# Patient Record
Sex: FEMALE | Race: WHITE | NOT HISPANIC OR LATINO | Employment: STUDENT | URBAN - NONMETROPOLITAN AREA
[De-identification: names, ages, dates, MRNs, and addresses within clinical notes are randomized per-mention and may not be internally consistent; named-entity substitution may affect disease eponyms.]

---

## 2021-09-05 ENCOUNTER — OFFICE VISIT (OUTPATIENT)
Dept: FAMILY MEDICINE | Facility: OTHER | Age: 21
End: 2021-09-05
Attending: INTERNAL MEDICINE
Payer: COMMERCIAL

## 2021-09-05 VITALS
HEART RATE: 55 BPM | RESPIRATION RATE: 18 BRPM | SYSTOLIC BLOOD PRESSURE: 118 MMHG | HEIGHT: 66 IN | DIASTOLIC BLOOD PRESSURE: 60 MMHG | BODY MASS INDEX: 23.78 KG/M2 | OXYGEN SATURATION: 98 % | WEIGHT: 148 LBS | TEMPERATURE: 97.5 F

## 2021-09-05 DIAGNOSIS — R22.1 THROAT SWELLING: Primary | ICD-10-CM

## 2021-09-05 DIAGNOSIS — R22.0 RIGHT FACIAL SWELLING: ICD-10-CM

## 2021-09-05 DIAGNOSIS — R13.10 PAIN WITH SWALLOWING: ICD-10-CM

## 2021-09-05 LAB — GROUP A STREP BY PCR: NOT DETECTED

## 2021-09-05 PROCEDURE — 87651 STREP A DNA AMP PROBE: CPT | Mod: ZL | Performed by: NURSE PRACTITIONER

## 2021-09-05 PROCEDURE — 99203 OFFICE O/P NEW LOW 30 MIN: CPT | Performed by: NURSE PRACTITIONER

## 2021-09-05 RX ORDER — SERTRALINE HYDROCHLORIDE 100 MG/1
100 TABLET, FILM COATED ORAL
COMMUNITY
Start: 2021-05-06

## 2021-09-05 ASSESSMENT — MIFFLIN-ST. JEOR: SCORE: 1453.07

## 2021-09-05 ASSESSMENT — PAIN SCALES - GENERAL: PAINLEVEL: SEVERE PAIN (6)

## 2021-09-05 NOTE — PATIENT INSTRUCTIONS
Neck swelling. Continue to ice the site. Use 800 mg ibuprofen every 6 hours (this puts you at max daily limit of 3200 mg per day).   Follow up emergently if symptoms worsen or if you develop inability to swallow or breathe during your travels home to [place].     At this time, causes are not related to ear infection, TMJ, sinus infection. No signs of infection are present (fevers,chills, or other symptoms).     Unknown cause of this but discussed could be a swollen lymph node related to allergies or a stone in a gland.     Claritan or zyrtec may be helpful.     If strep swab is positive, will treat with antibiotics.

## 2021-09-05 NOTE — PROGRESS NOTES
ASSESSMENT/PLAN:    I have reviewed the nursing notes.  I have reviewed the findings, diagnosis, plan and need for follow up with the patient.    1. Throat swelling  2. Pain with swallowing  3. Right facial swelling/neck swelling  - Group A Streptococcus PCR Throat Swab  Negative strep - no antibiotics are indicated.   Differential includes cervical lymphadenopathy of right side lymph node, salivary gland stone, strep pharyngitis. No evidence of peritonsillar abscess or dental abscess. Physical examination otherwise benign today. Afebrile. No indication to start empiric antibiotics as discussed with Lissa. I recommend 800 mg ibuprofen every six hours for swelling and discomfort along with ice compresses 4x per day. A CT scan was discussed and offered to diagnose cause of swelling which Lissa declined. I recommend if worsening symptoms she should be seen tonight when she returns home to [place]. She is leaving straight from here to drive home.     Discussed warning signs/symptoms indicative of need to f/u    Follow up if symptoms persist or worsen or concerns    I explained my diagnostic considerations and recommendations to the patient, who voiced understanding and agreement with the treatment plan. All questions were answered. We discussed potential side effects of any prescribed or recommended therapies, as well as expectations for response to treatments.    Deepa Mcdowell NP  9/5/2021  2:22 PM    HPI:  Lissa Ria Ray is a 21 year old female who presents to [facility] today for concerns of facial swelling to the right jaw line that started yesterday morning, worsening throughout the day. Woke up this morning much more significant and slight discomfort to the area. Does rate pain in the area a 6/10. No known broken teeth, states does get regular dental care. No fever/chills. Does not feel unwell in any way.  Pain feels more localized to the outside/skin rather than inside her mouth. She lives in  "[place] and will be traveling back home today. She is not agreeable to having a CT scan today (discussed may be indicated) or any further testing until she goes home due to time constraints.   Took 600 mg ibuprofen at 0930 this AM with some relief of the discomfort. Has been icing the site.   Denies ear pain, sore throat. States does hurt a bit to swallow on that side.     Otherwise negative ROS.     No past medical history on file.  No past surgical history on file.  Social History     Tobacco Use     Smoking status: Never Smoker     Smokeless tobacco: Never Used   Substance Use Topics     Alcohol use: Not Currently     Current Outpatient Medications   Medication Sig Dispense Refill     sertraline (ZOLOFT) 100 MG tablet Take 100 mg by mouth       No Known Allergies  Past medical history, past surgical history, current medications and allergies reviewed and accurate to the best of my knowledge.      ROS:  Refer to HPI    /60 (BP Location: Left arm, Patient Position: Sitting, Cuff Size: Adult Regular)   Pulse 55   Temp 97.5  F (36.4  C) (Tympanic)   Resp 18   Ht 1.676 m (5' 6\")   Wt 67.1 kg (148 lb)   LMP 08/30/2021   SpO2 98%   Breastfeeding No   BMI 23.89 kg/m      EXAM:  General Appearance: Well appearing 21 year old female, appropriate appearance for age. No acute distress  Ears: Left TM intact, translucent with bony landmarks appreciated, no erythema, no effusion, no bulging, no purulence.  Right TM intact, translucent with bony landmarks appreciated, no erythema, no effusion, no bulging, no purulence.  Left auditory canal clear.  Right auditory canal clear.  Normal external ears, non tender.  Eyes: conjunctivae normal without erythema or irritation, corneas clear, no drainage or crusting, no eyelid swelling, pupils equal   Orophayrnx: moist mucous membranes, posterior pharynx without erythema, tonsils without hypertrophy, no erythema, no exudates or petechiae, no post nasal drip seen, no " trismus, voice clear.    Sinuses:  No sinus tenderness upon palpation of the frontal or maxillary sinuses  Nose:  Bilateral nares: no erythema, no edema, no drainage or congestion   Neck: right side of neck below jaw line: there is a palpable cervical lymph node versus enlarged gland versus abscess with moderate amount of swelling to surrounding location. No erythema or warmth. No drainage observed.   Respiratory: normal chest wall and respirations.  Normal effort.  Clear to auscultation bilaterally, no wheezing, crackles or rhonchi.  No increased work of breathing.  No cough appreciated.  Cardiac: RRR with no murmurs  Psychological: normal affect, alert, oriented, and pleasant.

## 2021-09-05 NOTE — NURSING NOTE
"Chief Complaint   Patient presents with     Facial Swelling     Patient presented to the clinic with right sided facial swelling that started yesterday and this morning it is worse. She is unsure of what could be causing it.    Initial /60 (BP Location: Left arm, Patient Position: Sitting, Cuff Size: Adult Regular)   Pulse 55   Temp 97.5  F (36.4  C) (Tympanic)   Resp 18   Ht 1.676 m (5' 6\")   Wt 67.1 kg (148 lb)   LMP 08/30/2021   SpO2 98%   Breastfeeding No   BMI 23.89 kg/m   Estimated body mass index is 23.89 kg/m  as calculated from the following:    Height as of this encounter: 1.676 m (5' 6\").    Weight as of this encounter: 67.1 kg (148 lb).     FOOD SECURITY SCREENING QUESTIONS  Hunger Vital Signs:  Within the past 12 months we worried whether our food would run out before we got money to buy more. Never  Within the past 12 months the food we bought just didn't last and we didn't have money to get more. Never      Medication Reconciliation: Complete      Carla Douglas, LUIGI   "

## 2021-09-18 ENCOUNTER — HEALTH MAINTENANCE LETTER (OUTPATIENT)
Age: 21
End: 2021-09-18

## 2022-11-19 ENCOUNTER — HEALTH MAINTENANCE LETTER (OUTPATIENT)
Age: 22
End: 2022-11-19

## 2024-01-28 ENCOUNTER — HEALTH MAINTENANCE LETTER (OUTPATIENT)
Age: 24
End: 2024-01-28